# Patient Record
Sex: FEMALE | URBAN - METROPOLITAN AREA
[De-identification: names, ages, dates, MRNs, and addresses within clinical notes are randomized per-mention and may not be internally consistent; named-entity substitution may affect disease eponyms.]

---

## 2020-05-12 ENCOUNTER — APPOINTMENT (RX ONLY)
Dept: URBAN - METROPOLITAN AREA CLINIC 152 | Facility: CLINIC | Age: 25
Setting detail: DERMATOLOGY
End: 2020-05-12

## 2020-05-12 PROBLEM — D23.39 OTHER BENIGN NEOPLASM OF SKIN OF OTHER PARTS OF FACE: Status: ACTIVE | Noted: 2020-05-12

## 2020-05-12 PROCEDURE — ? COUNSELING

## 2020-05-12 PROCEDURE — ? DIAGNOSIS COMMENT

## 2020-05-12 PROCEDURE — 99202 OFFICE O/P NEW SF 15 MIN: CPT | Mod: 95

## 2020-05-12 PROCEDURE — ? OBSERVATION

## 2020-05-12 NOTE — HPI: OTHER
Condition:: New lesion
Please Describe Your Condition:: Pt notes the lesion looked like a sun spot for several months but then seemed to change into a pimple several weeks ago. No associated symptoms.